# Patient Record
Sex: MALE | Race: ASIAN | NOT HISPANIC OR LATINO | URBAN - METROPOLITAN AREA
[De-identification: names, ages, dates, MRNs, and addresses within clinical notes are randomized per-mention and may not be internally consistent; named-entity substitution may affect disease eponyms.]

---

## 2017-10-06 ENCOUNTER — COMMUNICATION - HEALTHEAST (OUTPATIENT)
Dept: FAMILY MEDICINE | Facility: CLINIC | Age: 47
End: 2017-10-06

## 2017-10-17 ENCOUNTER — OFFICE VISIT - HEALTHEAST (OUTPATIENT)
Dept: FAMILY MEDICINE | Facility: CLINIC | Age: 47
End: 2017-10-17

## 2017-10-17 DIAGNOSIS — S68.012A TRAUMATIC AMPUTATION OF LEFT THUMB, INITIAL ENCOUNTER: ICD-10-CM

## 2017-10-17 DIAGNOSIS — F06.31 DEPRESSION DUE TO PHYSICAL ILLNESS: ICD-10-CM

## 2017-10-17 RX ORDER — OXYCODONE HYDROCHLORIDE 5 MG/1
5-10 TABLET ORAL
Status: SHIPPED | COMMUNITY
Start: 2017-10-04

## 2017-10-17 RX ORDER — OXYCODONE HYDROCHLORIDE 5 MG/1
5 TABLET ORAL
Status: SHIPPED | COMMUNITY
Start: 2017-10-06

## 2017-10-17 RX ORDER — AMOXICILLIN 250 MG
CAPSULE ORAL
Status: SHIPPED | COMMUNITY
Start: 2017-10-04

## 2017-10-17 ASSESSMENT — MIFFLIN-ST. JEOR: SCORE: 1429.4

## 2017-10-18 RX ORDER — TRAZODONE HYDROCHLORIDE 50 MG/1
50 TABLET, FILM COATED ORAL AT BEDTIME
Qty: 30 TABLET | Refills: 3 | Status: SHIPPED | OUTPATIENT
Start: 2017-10-18

## 2017-10-20 ENCOUNTER — COMMUNICATION - HEALTHEAST (OUTPATIENT)
Dept: FAMILY MEDICINE | Facility: CLINIC | Age: 47
End: 2017-10-20

## 2021-05-31 ENCOUNTER — RECORDS - HEALTHEAST (OUTPATIENT)
Dept: ADMINISTRATIVE | Facility: CLINIC | Age: 51
End: 2021-05-31

## 2021-05-31 VITALS — HEIGHT: 64 IN | WEIGHT: 145.8 LBS | BODY MASS INDEX: 24.89 KG/M2

## 2021-06-13 NOTE — PROGRESS NOTES
"ASSESSMENT/PLAN:  1. Traumatic amputation of left thumb, initial encounter  Ambulatory referral to Psychology   2. Depression due to physical illness  Ambulatory referral to Psychology    traZODone (DESYREL) 50 MG tablet    DISCONTINUED: traZODone (DESYREL) 50 MG tablet       This is a 46 yo male, who suffered a traumatic amputation of his left thumb while working with a saw (happened at his workplace on 10/3/17).  He was brought to [facility] ER and brought immediately to surgery for reconstruction/reattachment of this distal digit.  He has follow up with his surgeon regularly - they are watching this closely.  He remains on antibiotics (Cephalexin) currently.  We discussed the possibility of poor healing, we discussed the possibility of ongoing sensory changes.  He will follow up with surgeon as already ordered.    Patient is also concerned by this incident causing an ongoing disability - not allowing him to work in the future.  While we discussed the limited disability caused by the loss of his thumb, it is clearly the psychological effects of this injury that make this more concerning.  He is sleeping poorly, he is worrying constantly, and thinks he can't return to work in the future.  This is concerning and needs to be addressed sooner rather than later.  I informed him I'm worried that his mental health may create more of a barrier to return to work than his physical well being.  I will start Trazodone 50 mg qhs as a sleep aide, and have convinced him of the need to talk to \"somebody\".  We agreed on a referral to psychology for counseling.          Medications Discontinued During This Encounter   Medication Reason     traZODone (DESYREL) 50 MG tablet Reorder     There are no Patient Instructions on file for this visit.    Chief Complaint:  Chief Complaint   Patient presents with     workmens compensation     left thumb was cut off and reattached on 10/3/2017 at [facility]       HPI:   Guido Frias is a 47 y.o. male " c/o  10/3/17   Was cutting wood - saw kicked back and cut off thumb (left thumb) - at level of DIP  Brought to [facility] (Dr. Zaheer Randolph - Plastic & Hand Surgery)  Was in surgery 8 hours (11:30 - 8:30 - remembers waking up at about 9 pm) - checked his thumb all night  Went home by noon the next day!    Saw the surgeon last week one time; will see him tomorrow  Worries about the health and his thumb  Can't sleep - thinking about his thumb, about his life  When wife goes to work, can't do anything  Frustrated  Depression  On Cephalexin 500 mg QID x 7 days      PMH:   Patient Active Problem List    Diagnosis Date Noted     Depression due to physical illness 10/17/2017     Traumatic amputation of left thumb 10/17/2017     Hand dysfunction 10/11/2017     Hyperlipidemia      Gastritis Due To H. Pylori      Lower Back Pain Chronic      Muscle Spasm      History reviewed. No pertinent past medical history.  History reviewed. No pertinent surgical history.  Social History     Social History     Marital status:      Spouse name: N/A     Number of children: N/A     Years of education: N/A     Occupational History     Not on file.     Social History Main Topics     Smoking status: Never Smoker     Smokeless tobacco: Never Used     Alcohol use Not on file     Drug use: Not on file     Sexual activity: Not on file     Other Topics Concern     Not on file     Social History Narrative     No narrative on file       Meds:    Current Outpatient Prescriptions:      ascorbic acid, vitamin C, (VITAMIN C) 1000 MG tablet, Take 1,000 mg by mouth., Disp: , Rfl:      aspirin 81 mg chewable tablet, Chew 81 mg., Disp: , Rfl:      cephalexin (KEFLEX) 500 MG capsule, Take 500 mg by mouth., Disp: , Rfl:      oxyCODONE (ROXICODONE) 5 MG immediate release tablet, Take 5-10 mg by mouth., Disp: , Rfl:      oxyCODONE (ROXICODONE) 5 MG immediate release tablet, Take 5 mg by mouth., Disp: , Rfl:      senna-docusate (PERICOLACE)  "8.6-50 mg tablet, Take by mouth., Disp: , Rfl:      traZODone (DESYREL) 50 MG tablet, Take 1 tablet (50 mg total) by mouth at bedtime., Disp: 30 tablet, Rfl: 3    Allergies:  No Known Allergies    ROS:  Pertinent positives as noted in HPI; otherwise 12 point ROS negative.      Physical Exam:  EXAM:  /70 (Patient Site: Right Arm, Patient Position: Sitting, Cuff Size: Adult Regular)  Pulse 69  Temp 97.8  F (36.6  C) (Oral)   Resp 14  Ht 5' 3.5\" (1.613 m)  Wt 145 lb 12.8 oz (66.1 kg)  SpO2 99%  BMI 25.42 kg/m2   Gen:  NAD, appears well, well-hydrated  HEENT:  TMs nl, oropharynx benign, nasal mucosa nl, conjunctiva clear  Neck:  Supple, no adenopathy, no thyromegaly, no carotid bruits, no JVD  Lungs:  Clear to auscultation bilaterally  Cor:  RRR no murmur  Abd:  Soft, nontender, BS+, no masses, no guarding or rebound, no HSM  Extr:  Neg. Except,  left thumb amputation - now reattached.  Splinted - dressings dry - tip of thumb has decreased sensation and sl pallor to distal tip of finger  Neuro:  No asymmetry  Skin:  Warm/dry                "

## 2021-06-16 PROBLEM — R29.898 HAND DYSFUNCTION: Status: ACTIVE | Noted: 2017-10-11

## 2021-06-16 PROBLEM — F06.31 DEPRESSION DUE TO PHYSICAL ILLNESS: Status: ACTIVE | Noted: 2017-10-17

## 2021-06-16 PROBLEM — S68.012A TRAUMATIC AMPUTATION OF LEFT THUMB: Status: ACTIVE | Noted: 2017-10-17
